# Patient Record
Sex: MALE | URBAN - METROPOLITAN AREA
[De-identification: names, ages, dates, MRNs, and addresses within clinical notes are randomized per-mention and may not be internally consistent; named-entity substitution may affect disease eponyms.]

---

## 2017-01-12 ENCOUNTER — PATIENT OUTREACH (OUTPATIENT)
Dept: FAMILY MEDICINE CLINIC | Facility: CLINIC | Age: 52
End: 2017-01-12

## 2017-01-12 NOTE — PROGRESS NOTES
LM to call back  # [phone]. CM name and number provided for further f/u. Calling to assist in coordination of healthcare needs. P= Await call return.

## 2017-01-26 ENCOUNTER — PATIENT OUTREACH (OUTPATIENT)
Dept: FAMILY MEDICINE CLINIC | Facility: CLINIC | Age: 52
End: 2017-01-26

## 2017-01-26 NOTE — PROGRESS NOTES
Due to fact pt hasn't been in the clinic since 2012 and has not responded to any of my or the previous CM outreaches, will remove my name as CM from him at this time and close off the case.